# Patient Record
Sex: MALE | URBAN - METROPOLITAN AREA
[De-identification: names, ages, dates, MRNs, and addresses within clinical notes are randomized per-mention and may not be internally consistent; named-entity substitution may affect disease eponyms.]

---

## 2021-01-11 ENCOUNTER — OFFICE VISIT (OUTPATIENT)
Dept: URBAN - METROPOLITAN AREA CLINIC 78 | Facility: CLINIC | Age: 81
End: 2021-01-11
Payer: COMMERCIAL

## 2021-01-11 PROCEDURE — 92134 CPTRZ OPH DX IMG PST SGM RTA: CPT | Performed by: OPHTHALMOLOGY

## 2021-01-11 PROCEDURE — 99204 OFFICE O/P NEW MOD 45 MIN: CPT | Performed by: OPHTHALMOLOGY

## 2021-01-11 ASSESSMENT — INTRAOCULAR PRESSURE
OS: 10
OD: 10

## 2021-01-11 NOTE — IMPRESSION/PLAN
Impression: Central retinal vein occlusion, right eye, with macular edema: H34.8110. Plan: Exam/OCT reveals a CRVO with macular edema OD. Discussed diagnosis of CRVO and ME. Discussed treatment with anti-VEGF therapy. Patient elects treatment. Will have to obtain insurance authorization. 

Return in 2 weeks, Avastin OD

## 2021-01-25 ENCOUNTER — PROCEDURE (OUTPATIENT)
Dept: URBAN - METROPOLITAN AREA CLINIC 78 | Facility: CLINIC | Age: 81
End: 2021-01-25
Payer: COMMERCIAL

## 2021-01-25 DIAGNOSIS — H34.8110 CENTRAL RETINAL VEIN OCCLUSION, RIGHT EYE, WITH MACULAR EDEMA: Primary | ICD-10-CM

## 2021-01-25 PROCEDURE — 67028 INJECTION EYE DRUG: CPT | Performed by: OPHTHALMOLOGY

## 2021-01-25 ASSESSMENT — INTRAOCULAR PRESSURE
OS: 12
OD: 14

## 2021-03-01 ENCOUNTER — OFFICE VISIT (OUTPATIENT)
Dept: URBAN - METROPOLITAN AREA CLINIC 78 | Facility: CLINIC | Age: 81
End: 2021-03-01
Payer: COMMERCIAL

## 2021-03-01 PROCEDURE — 67028 INJECTION EYE DRUG: CPT | Performed by: OPHTHALMOLOGY

## 2021-03-01 PROCEDURE — 92134 CPTRZ OPH DX IMG PST SGM RTA: CPT | Performed by: OPHTHALMOLOGY

## 2021-03-01 ASSESSMENT — INTRAOCULAR PRESSURE
OD: 14
OS: 13

## 2021-04-19 ENCOUNTER — PROCEDURE (OUTPATIENT)
Dept: URBAN - METROPOLITAN AREA CLINIC 78 | Facility: CLINIC | Age: 81
End: 2021-04-19
Payer: COMMERCIAL

## 2021-04-19 PROCEDURE — 92134 CPTRZ OPH DX IMG PST SGM RTA: CPT | Performed by: OPHTHALMOLOGY

## 2021-04-19 PROCEDURE — 67028 INJECTION EYE DRUG: CPT | Performed by: OPHTHALMOLOGY

## 2021-04-19 ASSESSMENT — INTRAOCULAR PRESSURE
OD: 11
OS: 14

## 2021-06-07 ENCOUNTER — OFFICE VISIT (OUTPATIENT)
Dept: URBAN - METROPOLITAN AREA CLINIC 78 | Facility: CLINIC | Age: 81
End: 2021-06-07
Payer: COMMERCIAL

## 2021-06-07 PROCEDURE — 92014 COMPRE OPH EXAM EST PT 1/>: CPT | Performed by: OPHTHALMOLOGY

## 2021-06-07 PROCEDURE — 92134 CPTRZ OPH DX IMG PST SGM RTA: CPT | Performed by: OPHTHALMOLOGY

## 2021-06-07 PROCEDURE — 67028 INJECTION EYE DRUG: CPT | Performed by: OPHTHALMOLOGY

## 2021-06-07 ASSESSMENT — INTRAOCULAR PRESSURE
OS: 13
OD: 12

## 2021-06-07 NOTE — IMPRESSION/PLAN
Impression: Central retinal vein occlusion, right eye, with macular edema: H34.8110. Plan: Last treated with Avastin injection weeks ago (3rd Avastin). Exam/OCT reveals persistent macular edema OD. Patient not responding well to Avastin. Recommend switching to [facility]. Eylea administered OD. Return in 4-5 weeks, OCT OU, Eylea OD

## 2021-07-12 ENCOUNTER — PROCEDURE (OUTPATIENT)
Dept: URBAN - METROPOLITAN AREA CLINIC 78 | Facility: CLINIC | Age: 81
End: 2021-07-12
Payer: COMMERCIAL

## 2021-07-12 PROCEDURE — 67028 INJECTION EYE DRUG: CPT | Performed by: OPHTHALMOLOGY

## 2021-07-12 PROCEDURE — 92134 CPTRZ OPH DX IMG PST SGM RTA: CPT | Performed by: OPHTHALMOLOGY

## 2021-07-12 ASSESSMENT — INTRAOCULAR PRESSURE
OD: 16
OS: 15